# Patient Record
Sex: MALE | Race: ASIAN | URBAN - METROPOLITAN AREA
[De-identification: names, ages, dates, MRNs, and addresses within clinical notes are randomized per-mention and may not be internally consistent; named-entity substitution may affect disease eponyms.]

---

## 2018-10-16 ENCOUNTER — OFFICE VISIT (OUTPATIENT)
Dept: FAMILY MEDICINE CLINIC | Age: 33
End: 2018-10-16

## 2018-10-16 VITALS
BODY MASS INDEX: 24.38 KG/M2 | WEIGHT: 180 LBS | HEART RATE: 87 BPM | TEMPERATURE: 99 F | DIASTOLIC BLOOD PRESSURE: 72 MMHG | SYSTOLIC BLOOD PRESSURE: 113 MMHG | HEIGHT: 72 IN | OXYGEN SATURATION: 98 % | RESPIRATION RATE: 16 BRPM

## 2018-10-16 DIAGNOSIS — R10.9 ABDOMINAL DISCOMFORT: ICD-10-CM

## 2018-10-16 DIAGNOSIS — Z76.89 ENCOUNTER TO ESTABLISH CARE: Primary | ICD-10-CM

## 2018-10-16 DIAGNOSIS — Z00.00 ENCOUNTER FOR WELL ADULT EXAM WITHOUT ABNORMAL FINDINGS: ICD-10-CM

## 2018-10-16 DIAGNOSIS — Z23 ENCOUNTER FOR IMMUNIZATION: ICD-10-CM

## 2018-10-16 RX ORDER — BISMUTH SUBSALICYLATE 262 MG
1 TABLET,CHEWABLE ORAL DAILY
COMMUNITY

## 2018-10-16 NOTE — PATIENT INSTRUCTIONS

## 2018-10-16 NOTE — PROGRESS NOTES
[facility] Note Subjective: Chief Complaint Patient presents with  New Patient  Complete Physical  
  not fasting Norma Lawson is a 35y.o. year old male who presents for evaluation of the following: 
 
Establishment of Care: 
Previous PCP: None Care Team:  
Dentist- seen in the past 1 year Optho- none PMH:  
None Acute Concerns: 
On ROS patient endorses occasional abdominal RLQ tightness/ pressure that is self limited. Described as \"gas\" and states not painful. No current pain. No change in bowel habits. Social:  
Came to the [address] [place] [address],[address] 2009 from [place]. Went to school in [place]. 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 Lives with wife and daughter age 8mo Mood is \"ok\" Health Maintenance:  
Diet: balanced Activity: None TDaP: Declined Influenza: Due Pneumovax: Not due Prevnar @65: Not due Shingles @60: Not due Colonoscopy @50: Not due/ No family history ASA @ 55f and 45m: Not due Dexa @65: Not due HIV or other STD testing: Declined Domestic Violence Screen: Negative Depression Screen: Denies depression or anhedonia PHQ over the last two weeks 10/16/2018 Little interest or pleasure in doing things Not at all Feeling down, depressed, irritable, or hopeless Not at all Total Score PHQ 2 0 Smoker? No 
 
 reports that he currently engages in sexual activity and has had female partners.  Prostate Check:  
No Fam Hx of prostate cancer Denies groin pain/pulling, penile bleeding/deischarge, dysuria, nighttime urination. Review of Systems Pertinent positives and negative per HPI. All other systems  reviewed are negative for a Comprehensive ROS (10+). History reviewed. No pertinent past medical history. Social History Social History  Marital status:  Spouse name: N/A  
 Number of children: N/A  
 Years of education: N/A Occupational History  Not on file. Social History Main Topics  Smoking status: Never Smoker  Smokeless tobacco: Never Used  Alcohol use 0.6 oz/week 1 Cans of beer per week Comment: occ  Drug use: No  
 Sexual activity: Yes  
  Partners: Female Other Topics Concern  Not on file Social History Narrative  No narrative on file Current Outpatient Prescriptions Medication Sig  
 multivitamin (ONE A DAY) tablet Take 1 Tab by mouth daily. No current facility-administered medications for this visit. Objective:  
 
Vitals:  
 10/16/18 7571 BP: 113/72 Pulse: 87 Resp: 16 Temp: 99 °F (37.2 °C) TempSrc: Oral  
SpO2: 98% Weight: 180 lb (81.6 kg) Height: 6' 0.25\" (1.835 m) Physical Examination: 
General: Alert, cooperative, no distress, appears stated age. Eyes: Conjunctivae clear. PERRL, EOMs intact. Ears: Normal external ear canals both ears. TM clear and mobile bilaterally Nose: Nares normal. Septum midline. Mucosa normal. No drainage or sinus tenderness. Mouth/Throat: Lips, mucosa, and tongue normal.  
Neck: Supple, symmetrical, trachea midline, no adenopathy. No thyroid enlargement/tenderness/nodules Back: Symmetric, no curvature. ROM normal. No CVA tenderness. Lungs: Clear to auscultation bilaterally. Normal inspiratory and expiratory ratio. Heart: Regular rate and rhythm, S1, S2 normal, no murmur, click, rub or gallop. Abdomen: Soft, non-tender. Bowel sounds normal. No masses or organomegaly. : Normal Preparing male genitalia. No inguinal mass palpated. Extremities: Extremities normal, atraumatic, no cyanosis or edema. Pulses: 2+ and symmetric all extremities. Skin: Skin color, texture, turgor normal. No rashes or lesions on exposed skin. Lymph nodes: Cervical, supraclavicular nodes normal. 
Neurologic: CNII-XII intact. Strength 5/5 grossly. Sensation and reflexes normal throughout. No results found for any previous visit. Assessment/ Plan: Diagnoses and all orders for this visit: 
 
Encounter to establish care Encounter for well adult exam without abnormal findings -     LIPID PANEL 
-     METABOLIC PANEL, COMPREHENSIVE 
-     CBC WITH AUTOMATED DIFF Encounter for immunization 
-     INFLUENZA VIRUS VAC QUAD,SPLIT,PRESV FREE SYRINGE IM 
-     OH IMMUNIZ ADMIN,1 SINGLE/COMB VAC/TOXOID Abdominal discomfort Other orders -     CVD REPORT Doing well overall STI screen declined. Labs to eval end organ function today Vaccines reviewed, flu shot given. Groin exam reassuring. Abdominal discomfort likely MSK/ muscle strain. RCT if recurs and not responsive to otc analgesia/stretching. Educated patient on red flag symptoms to warrant return to clinic or emergency room visit. I have discussed the diagnosis with the patient and the intended plan as seen in the above orders. The patient has been offered or received an after-visit summary and questions were answered concerning future plans. I have discussed medication side effects and warnings with the patient as well. Follow-up Disposition: 
Return in about 1 year (around 10/16/2019) for Physical exam. 
 
 
Signed, Angela Sparks MD 
10/16/2018

## 2018-10-16 NOTE — PROGRESS NOTES
Chief Complaint Patient presents with  New Patient  Complete Physical  
  not fasting 1. Have you been to the ER, urgent care clinic since your last visit? Hospitalized since your last visit? No 
 
2. Have you seen or consulted any other health care providers outside of the [address] [place] [address] since your last visit? Include any pap smears or colon screening.  No

## 2018-10-16 NOTE — MR AVS SNAPSHOT
[facility] 
 
 
 [address] 
[phone] Patient: Benoit Collins MRN: [id number]:[id number] Visit Information Date & Time Provider Department Dept. Phone Encounter #  
 10/16/2018  8:30 AM Deborah Aguilar  [facility] [phone] [id number] Follow-up Instructions Return in about 1 year (around 10/16/2019) for Physical exam. Upcoming Health Maintenance Date Due Influenza Age 5 to Adult 8/1/2018 DTaP/Tdap/Td series (1 - Tdap) 10/16/2019* *Topic was postponed. The date shown is not the original due date. Allergies as of 10/16/2018  Review Complete On: 10/16/2018 By: Yarelis Hayes Severity Noted Reaction Type Reactions Penicillins High 10/16/2018    Other (comments) Not sure what reaction Current Immunizations  Never Reviewed No immunizations on file. Not reviewed this visit You Were Diagnosed With   
  
 Codes Comments Encounter to establish care    -  Primary ICD-10-CM: Z76.89 
ICD-9-CM: V65.8 Encounter for well adult exam without abnormal findings     ICD-10-CM: Z00.00 ICD-9-CM: V70.0 Vitals BP Pulse Temp Resp Height(growth percentile) Weight(growth percentile) 113/72 (BP 1 Location: Left arm, BP Patient Position: Sitting) 87 99 °F (37.2 °C) (Oral) 16 6' 0.25\" (1.835 m) 180 lb (81.6 kg) SpO2 BMI Smoking Status 98% 24.24 kg/m2 Never Smoker BMI and BSA Data Body Mass Index Body Surface Area  
 24.24 kg/m 2 2.04 m 2 Your Updated Medication List  
  
   
This list is accurate as of 10/16/18  9:06 AM.  Always use your most recent med list.  
  
  
  
  
 multivitamin tablet Commonly known as:  ONE A DAY Take 1 Tab by mouth daily. We Performed the Following CBC WITH AUTOMATED DIFF [19847 CPT(R)] LIPID PANEL [67104 CPT(R)] METABOLIC PANEL, COMPREHENSIVE [32655 CPT(R)] Follow-up Instructions Return in about 1 year (around 10/16/2019) for Physical exam.  
  
  
Patient Instructions Well Visit, Ages 25 to 48: Care Instructions Your Care Instructions Physical exams can help you stay healthy. Your doctor has checked your overall health and may have suggested ways to take good care of yourself. He or she also may have recommended tests. At home, you can help prevent illness with healthy eating, regular exercise, and other steps. Follow-up care is a key part of your treatment and safety. Be sure to make and go to all appointments, and call your doctor if you are having problems. It's also a good idea to know your test results and keep a list of the medicines you take. How can you care for yourself at home? · Reach and stay at a healthy weight. This will lower your risk for many problems, such as obesity, diabetes, heart disease, and high blood pressure. · Get at least 30 minutes of physical activity on most days of the week. Walking is a good choice. You also may want to do other activities, such as running, swimming, cycling, or playing tennis or team sports. Discuss any changes in your exercise program with your doctor. · Do not smoke or allow others to smoke around you. If you need help quitting, talk to your doctor about stop-smoking programs and medicines. These can increase your chances of quitting for good. · Talk to your doctor about whether you have any risk factors for sexually transmitted infections (STIs). Having one sex partner (who does not have STIs and does not have sex with anyone else) is a good way to avoid these infections. · Use birth control if you do not want to have children at this time. Talk with your doctor about the choices available and what might be best for you. · Protect your skin from too much sun.  When you're outdoors from 10 a.m. to 4 p.m., stay in the shade or cover up with clothing and a hat with a wide brim. Wear sunglasses that block UV rays. Even when it's cloudy, put broad-spectrum sunscreen (SPF 30 or higher) on any exposed skin. · See a dentist one or two times a year for checkups and to have your teeth cleaned. · Wear a seat belt in the car. · Drink alcohol in moderation, if at all. That means no more than 2 drinks a day for men and 1 drink a day for women. Follow your doctor's advice about when to have certain tests. These tests can spot problems early. For everyone · Cholesterol. Have the fat (cholesterol) in your blood tested after age 21. Your doctor will tell you how often to have this done based on your age, family history, or other things that can increase your risk for heart disease. · Blood pressure. Have your blood pressure checked during a routine doctor visit. Your doctor will tell you how often to check your blood pressure based on your age, your blood pressure results, and other factors. · Vision. Talk with your doctor about how often to have a glaucoma test. 
· Diabetes. Ask your doctor whether you should have tests for diabetes. · Colon cancer. Have a test for colon cancer at age 48. You may have one of several tests. If you are younger than 48, you may need a test earlier if you have any risk factors. Risk factors include whether you already had a precancerous polyp removed from your colon or whether your parent, brother, sister, or child has had colon cancer. For women · Breast exam and mammogram. Talk to your doctor about when you should have a clinical breast exam and a mammogram. Medical experts differ on whether and how often women under 50 should have these tests. Your doctor can help you decide what is right for you. · Pap test and pelvic exam. Begin Pap tests at age 24. A Pap test is the best way to find cervical cancer.  The test often is part of a pelvic exam. Ask how often to have this test. 
 · Tests for sexually transmitted infections (STIs). Ask whether you should have tests for STIs. You may be at risk if you have sex with more than one person, especially if your partners do not wear condoms. For men · Tests for sexually transmitted infections (STIs). Ask whether you should have tests for STIs. You may be at risk if you have sex with more than one person, especially if you do not wear a condom. · Testicular cancer exam. Ask your doctor whether you should check your testicles regularly. · Prostate exam. Talk to your doctor about whether you should have a blood test (called a PSA test) for prostate cancer. Experts differ on whether and when men should have this test. Some experts suggest it if you are older than 39 and are -American or have a father or brother who got prostate cancer when he was younger than 72. When should you call for help? Watch closely for changes in your health, and be sure to contact your doctor if you have any problems or symptoms that concern you. Where can you learn more? Go to http://melissa-ami.info/. Enter P072 in the search box to learn more about \"Well Visit, Ages 25 to 48: Care Instructions. \" Current as of: March 29, 2018 Content Version: 11.8 © 5927-1393 [employer]. Care instructions adapted under license by [employer] (which disclaims liability or warranty for this information). If you have questions about a medical condition or this instruction, always ask your healthcare professional. [address] any warranty or liability for your use of this information. Introducing [facility]! Magdalena Millan introduces [employer] patient portal. Now you can access parts of your medical record, email your doctor's office, and request medication refills online. 1. In your internet browser, go to [email or web address] 2. Click on the First Time User? Click Here link in the Sign In box. You will see the New Member Sign Up page. 3. Enter your [facility] Access Code exactly as it appears below. You will not need to use this code after youve completed the sign-up process. If you do not sign up before the expiration date, you must request a new code. · [facility] [place] Access Code: [email or web address] Expires: 1/14/2019  8:38 AM 
 
4. Enter the last four digits of your Social Security Number (xxxx) and Date of Birth (mm/dd/yyyy) as indicated and click Submit. You will be taken to the next sign-up page. 5. Create a [facility] [place] ID. This will be your [facility] login ID and cannot be changed, so think of one that is secure and easy to remember. 6. Create a [facility] password. You can change your password at any time. 7. Enter your Password Reset Question and Answer. This can be used at a later time if you forget your password. 8. Enter your e-mail address. You will receive e-mail notification when new information is available in [address] 9. Click Sign Up. You can now view and download portions of your medical record. 10. Click the Download Summary menu link to download a portable copy of your medical information. If you have questions, please visit the Frequently Asked Questions section of the [facility] website. Remember, [facility] is NOT to be used for urgent needs. For medical emergencies, dial 911. Now available from your iPhone and Android! Please provide this summary of care documentation to your next provider. Your primary care clinician is listed as Jose Perry. If you have any questions after today's visit, please call [phone].

## 2018-10-17 LAB
ALBUMIN SERPL-MCNC: 4.4 G/DL (ref 3.5–5.5)
ALBUMIN/GLOB SERPL: 1.5 {RATIO} (ref 1.2–2.2)
ALP SERPL-CCNC: 58 IU/L (ref 39–117)
ALT SERPL-CCNC: 32 IU/L (ref 0–44)
AST SERPL-CCNC: 26 IU/L (ref 0–40)
BASOPHILS # BLD AUTO: 0 X10E3/UL (ref 0–0.2)
BASOPHILS NFR BLD AUTO: 0 %
BILIRUB SERPL-MCNC: 0.8 MG/DL (ref 0–1.2)
BUN SERPL-MCNC: 15 MG/DL (ref 6–20)
BUN/CREAT SERPL: 16 (ref 9–20)
CALCIUM SERPL-MCNC: 9.5 MG/DL (ref 8.7–10.2)
CHLORIDE SERPL-SCNC: 102 MMOL/L (ref 96–106)
CHOLEST SERPL-MCNC: 197 MG/DL (ref 100–199)
CO2 SERPL-SCNC: 24 MMOL/L (ref 20–29)
CREAT SERPL-MCNC: 0.91 MG/DL (ref 0.76–1.27)
EOSINOPHIL # BLD AUTO: 0.1 X10E3/UL (ref 0–0.4)
EOSINOPHIL NFR BLD AUTO: 1 %
ERYTHROCYTE [DISTWIDTH] IN BLOOD BY AUTOMATED COUNT: 13 % (ref 12.3–15.4)
GLOBULIN SER CALC-MCNC: 2.9 G/DL (ref 1.5–4.5)
GLUCOSE SERPL-MCNC: 104 MG/DL (ref 65–99)
HCT VFR BLD AUTO: 41.3 % (ref 37.5–51)
HDLC SERPL-MCNC: 42 MG/DL
HGB BLD-MCNC: 13.4 G/DL (ref 13–17.7)
IMM GRANULOCYTES # BLD: 0 X10E3/UL (ref 0–0.1)
IMM GRANULOCYTES NFR BLD: 0 %
INTERPRETATION, 910389: NORMAL
LDLC SERPL CALC-MCNC: 115 MG/DL (ref 0–99)
LYMPHOCYTES # BLD AUTO: 1.6 X10E3/UL (ref 0.7–3.1)
LYMPHOCYTES NFR BLD AUTO: 29 %
MCH RBC QN AUTO: 27.9 PG (ref 26.6–33)
MCHC RBC AUTO-ENTMCNC: 32.4 G/DL (ref 31.5–35.7)
MCV RBC AUTO: 86 FL (ref 79–97)
MONOCYTES # BLD AUTO: 0.6 X10E3/UL (ref 0.1–0.9)
MONOCYTES NFR BLD AUTO: 11 %
NEUTROPHILS # BLD AUTO: 3.2 X10E3/UL (ref 1.4–7)
NEUTROPHILS NFR BLD AUTO: 59 %
PLATELET # BLD AUTO: 224 X10E3/UL (ref 150–379)
POTASSIUM SERPL-SCNC: 4 MMOL/L (ref 3.5–5.2)
PROT SERPL-MCNC: 7.3 G/DL (ref 6–8.5)
RBC # BLD AUTO: 4.81 X10E6/UL (ref 4.14–5.8)
SODIUM SERPL-SCNC: 140 MMOL/L (ref 134–144)
TRIGL SERPL-MCNC: 199 MG/DL (ref 0–149)
VLDLC SERPL CALC-MCNC: 40 MG/DL (ref 5–40)
WBC # BLD AUTO: 5.6 X10E3/UL (ref 3.4–10.8)

## 2018-10-19 NOTE — PROGRESS NOTES
Notify Patient:  
Most of your test results are normal.  One of your cholesterol numbers was higher than normal.  Try to avoid foods high in saturated fats such as processed meats, fried foods, and greasy snacks.

## 2018-10-19 NOTE — PROGRESS NOTES
Outbound call to patient. Name and  verified. Reviewed recent lab results. Patient verbalized understanding.